# Patient Record
Sex: MALE | Race: WHITE | NOT HISPANIC OR LATINO
[De-identification: names, ages, dates, MRNs, and addresses within clinical notes are randomized per-mention and may not be internally consistent; named-entity substitution may affect disease eponyms.]

---

## 2017-03-20 ENCOUNTER — RESULT REVIEW (OUTPATIENT)
Age: 42
End: 2017-03-20

## 2021-11-04 ENCOUNTER — APPOINTMENT (OUTPATIENT)
Dept: DISASTER EMERGENCY | Facility: HOSPITAL | Age: 46
End: 2021-11-04

## 2021-11-04 ENCOUNTER — TRANSCRIPTION ENCOUNTER (OUTPATIENT)
Age: 46
End: 2021-11-04

## 2021-11-05 ENCOUNTER — TRANSCRIPTION ENCOUNTER (OUTPATIENT)
Age: 46
End: 2021-11-05

## 2021-11-06 ENCOUNTER — OUTPATIENT (OUTPATIENT)
Dept: INPATIENT UNIT | Facility: HOSPITAL | Age: 46
LOS: 1 days | End: 2021-11-06
Payer: COMMERCIAL

## 2021-11-06 ENCOUNTER — APPOINTMENT (OUTPATIENT)
Dept: DISASTER EMERGENCY | Facility: HOSPITAL | Age: 46
End: 2021-11-06

## 2021-11-06 VITALS
SYSTOLIC BLOOD PRESSURE: 129 MMHG | TEMPERATURE: 98 F | HEIGHT: 67 IN | WEIGHT: 220.02 LBS | OXYGEN SATURATION: 99 % | HEART RATE: 67 BPM | DIASTOLIC BLOOD PRESSURE: 87 MMHG | RESPIRATION RATE: 18 BRPM

## 2021-11-06 VITALS
RESPIRATION RATE: 18 BRPM | OXYGEN SATURATION: 97 % | TEMPERATURE: 99 F | DIASTOLIC BLOOD PRESSURE: 86 MMHG | SYSTOLIC BLOOD PRESSURE: 129 MMHG | HEART RATE: 73 BPM

## 2021-11-06 DIAGNOSIS — U07.1 COVID-19: ICD-10-CM

## 2021-11-06 PROCEDURE — M0243: CPT

## 2021-11-06 RX ORDER — SODIUM CHLORIDE 9 MG/ML
250 INJECTION INTRAMUSCULAR; INTRAVENOUS; SUBCUTANEOUS
Refills: 0 | Status: COMPLETED | OUTPATIENT
Start: 2021-11-06 | End: 2021-11-06

## 2021-11-06 RX ADMIN — SODIUM CHLORIDE 310 MILLILITER(S): 9 INJECTION INTRAMUSCULAR; INTRAVENOUS; SUBCUTANEOUS at 13:58

## 2021-11-06 NOTE — CHART NOTE - NSCHARTNOTEFT_GEN_A_CORE
I have reviewed the  Casirivimab/Imdevimab Emergency Use Authorization (EAU) and I have provided the patient or patient's caregiver with the following information:  1. FDA has authorized emergency use of Casirivimab/Imdevimab , which is not FDA-approved biologic product.  2. The patient or patient's caregiver has the option to accept or refuse administration of Casirivimab  3. The significant risks and benefits are unknown.  4. Information on available alternative treatments and risks and benefits of those alternatives.    Discharge:  T(C): 37.3 (11-06-21 @ 15:19), Max: 37.4 (11-06-21 @ 14:08)  HR: 73 (11-06-21 @ 15:19) (67 - 77)  BP: 129/86 (11-06-21 @ 15:19) (126/84 - 133/86)  RR: 18 (11-06-21 @ 15:19) (18 - 18)  SpO2: 97% (11-06-21 @ 15:19) (95% - 99%)  Patient tolerated infusion well denies complaints of chest pain/SOB/dizzines/ palps  VSS for discharge home  D/C instructions given/ fact sheet included.  Patient to follow-up with PCP as needed

## 2021-11-06 NOTE — CHART NOTE - NSCHARTNOTEFT_GEN_A_CORE
CC: Monoclonal Antibody Infusion/COVID 19 Positive      History: Patient presents for infusion of monoclonal antibody infusion. Patient has been screened and was deemed to be a candidate.    Exposure: returned from wedding in [place]>several attendees COVID +    Symptoms/ Criteria: fever cough headache    Inclusion Criteria: asthma    Date of Positive Test: verified by clinical call center     Date of symptom onset: 11/4    Vaccine status: Pfizer 6/21    PMHx:  Infection due to severe acute respiratory syndrome coronavirus 2 (SARS-CoV-2)          T(C): 37.4 (11-06-21 @ 14:08), Max: 37.4 (11-06-21 @ 14:08)  HR: 73 (11-06-21 @ 14:08) (67 - 77)  BP: 132/86 (11-06-21 @ 14:08) (129/87 - 133/86)  RR: 18 (11-06-21 @ 14:08) (18 - 18)  SpO2: 95% (11-06-21 @ 14:08) (95% - 99%)      PE:   Appearance: NAD	  HEENT:   Normal oral mucosa.   Lymphatic: No lymphadenopathy  Cardiovascular: Normal S1 S2, No JVD, No murmurs, No edema  Respiratory: Lungs clear to auscultation	  Gastrointestinal:  Soft, Non-tender. No guarding   Skin: warm and dry  Neurologic: Non-focal  Extremities: Normal range of motion.     ASSESSMENT:  Pt is a 46y year old Male without significant  PMH  Covid +  referred to the infusion center for Monoclonal antibody infusion (Regeneron).        PLAN:  - infusion procedure explained to patient   - Consent for monoclonal antibody infusion obtained   - Risk & benefits discussed/all questions answered  - infuse Regeneron per protocol  - will observe patient for one hour post infusion  and then if stable discharge home with outpt follow up as planned by PMD.        - Patient tolerated infusion well denies complaints of chest pain/SOB/dizziness/ palpitations  - VSS for discharge home  - D/C instructions given/ fact sheet included.  - Patient to follow-up with PCP as needed. CC: Monoclonal Antibody Infusion/COVID 19 Positive      History: Patient presents for infusion of monoclonal antibody infusion. Patient has been screened and was deemed to be a candidate.    Exposure: 7 year old son COVID +    Symptoms/ Criteria: fever cough headache    Inclusion Criteria: asthma    Date of Positive Test: verified by clinical call center     Date of symptom onset: 11/4    Vaccine status: Pfizer 6/21    PMHx:  Infection due to severe acute respiratory syndrome coronavirus 2 (SARS-CoV-2)          T(C): 37.4 (11-06-21 @ 14:08), Max: 37.4 (11-06-21 @ 14:08)  HR: 73 (11-06-21 @ 14:08) (67 - 77)  BP: 132/86 (11-06-21 @ 14:08) (129/87 - 133/86)  RR: 18 (11-06-21 @ 14:08) (18 - 18)  SpO2: 95% (11-06-21 @ 14:08) (95% - 99%)      PE:   Appearance: NAD	  HEENT:   Normal oral mucosa.   Lymphatic: No lymphadenopathy  Cardiovascular: Normal S1 S2, No JVD, No murmurs, No edema  Respiratory: Lungs clear to auscultation	  Gastrointestinal:  Soft, Non-tender. No guarding   Skin: warm and dry  Neurologic: Non-focal  Extremities: Normal range of motion.     ASSESSMENT:  Pt is a 46y year old Male without significant  PMH  Covid +  referred to the infusion center for Monoclonal antibody infusion (Regeneron).        PLAN:  - infusion procedure explained to patient   - Consent for monoclonal antibody infusion obtained   - Risk & benefits discussed/all questions answered  - infuse Regeneron per protocol  - will observe patient for one hour post infusion  and then if stable discharge home with outpt follow up as planned by PMD.        - Patient tolerated infusion well denies complaints of chest pain/SOB/dizziness/ palpitations  - VSS for discharge home  - D/C instructions given/ fact sheet included.  - Patient to follow-up with PCP as needed.

## 2021-11-07 ENCOUNTER — TRANSCRIPTION ENCOUNTER (OUTPATIENT)
Age: 46
End: 2021-11-07